# Patient Record
Sex: MALE | Employment: FULL TIME | URBAN - NONMETROPOLITAN AREA
[De-identification: names, ages, dates, MRNs, and addresses within clinical notes are randomized per-mention and may not be internally consistent; named-entity substitution may affect disease eponyms.]

---

## 2024-03-10 ENCOUNTER — HOSPITAL ENCOUNTER (EMERGENCY)
Facility: HOSPITAL | Age: 54
Discharge: HOME | End: 2024-03-10
Attending: EMERGENCY MEDICINE
Payer: OTHER MISCELLANEOUS

## 2024-03-10 ENCOUNTER — APPOINTMENT (EMERGENCY)
Dept: RADIOLOGY | Facility: HOSPITAL | Age: 54
End: 2024-03-10
Payer: OTHER MISCELLANEOUS

## 2024-03-10 VITALS
HEART RATE: 98 BPM | HEIGHT: 68 IN | TEMPERATURE: 98.5 F | OXYGEN SATURATION: 98 % | SYSTOLIC BLOOD PRESSURE: 166 MMHG | WEIGHT: 170 LBS | DIASTOLIC BLOOD PRESSURE: 91 MMHG | BODY MASS INDEX: 25.76 KG/M2 | RESPIRATION RATE: 18 BRPM

## 2024-03-10 DIAGNOSIS — T14.8XXA CONTUSION: Primary | ICD-10-CM

## 2024-03-10 DIAGNOSIS — T14.8XXA BLOOD BLISTER: ICD-10-CM

## 2024-03-10 PROCEDURE — 99284 EMERGENCY DEPT VISIT MOD MDM: CPT | Performed by: EMERGENCY MEDICINE

## 2024-03-10 PROCEDURE — 99283 EMERGENCY DEPT VISIT LOW MDM: CPT

## 2024-03-10 PROCEDURE — 73140 X-RAY EXAM OF FINGER(S): CPT

## 2024-03-10 RX ORDER — IBUPROFEN 600 MG/1
600 TABLET ORAL EVERY 6 HOURS PRN
Qty: 30 TABLET | Refills: 0 | Status: SHIPPED | OUTPATIENT
Start: 2024-03-10 | End: 2024-03-11

## 2024-03-10 NOTE — DISCHARGE INSTRUCTIONS
Return to the ER immediately for any worsening symptoms.  Please follow-up with your PCP/workman's compensation at your job for further evaluation and management.

## 2024-03-10 NOTE — ED PROVIDER NOTES
History  Chief Complaint   Patient presents with    Finger Laceration     Pt arrives reporting left middle finger got pinched at work yesterday and today is more swollen     HPI    None       Past Medical History:   Diagnosis Date    Diabetes mellitus (HCC)     Hypertension     Renal disorder        History reviewed. No pertinent surgical history.    History reviewed. No pertinent family history.  I have reviewed and agree with the history as documented.    E-Cigarette/Vaping    E-Cigarette Use Never User      E-Cigarette/Vaping Substances     Social History     Tobacco Use    Smoking status: Never    Smokeless tobacco: Never   Vaping Use    Vaping status: Never Used   Substance Use Topics    Alcohol use: Never    Drug use: Never       Review of Systems   Constitutional:  Negative for chills and fever.   HENT:  Negative for ear pain and sore throat.    Eyes:  Negative for pain and visual disturbance.   Respiratory:  Negative for cough and shortness of breath.    Cardiovascular:  Negative for chest pain and palpitations.   Gastrointestinal:  Negative for abdominal pain and vomiting.   Genitourinary:  Negative for dysuria and hematuria.   Musculoskeletal:  Negative for arthralgias and back pain.   Skin:  Positive for color change.   Neurological:  Negative for seizures and syncope.   All other systems reviewed and are negative.      Physical Exam  Physical Exam  Vitals and nursing note reviewed.   Constitutional:       General: He is not in acute distress.     Appearance: Normal appearance. He is well-developed and normal weight. He is not ill-appearing.   HENT:      Head: Normocephalic and atraumatic.      Right Ear: External ear normal.      Left Ear: External ear normal.      Nose: Nose normal.      Mouth/Throat:      Mouth: Mucous membranes are moist.   Eyes:      Extraocular Movements: Extraocular movements intact.      Conjunctiva/sclera: Conjunctivae normal.   Pulmonary:      Effort: Pulmonary effort is normal.  No respiratory distress.   Abdominal:      Tenderness: There is no abdominal tenderness.   Musculoskeletal:         General: Swelling, tenderness and signs of injury present.      Cervical back: Normal range of motion and neck supple.      Comments: Blood blister present to the left middle finger with swelling, mild decreased range of motion secondary to pain   Skin:     General: Skin is warm and dry.      Capillary Refill: Capillary refill takes less than 2 seconds.   Neurological:      Mental Status: He is alert.   Psychiatric:         Mood and Affect: Mood normal.         Vital Signs  ED Triage Vitals [03/10/24 0849]   Temperature Pulse Respirations Blood Pressure SpO2   98.5 °F (36.9 °C) 98 18 166/91 98 %      Temp src Heart Rate Source Patient Position - Orthostatic VS BP Location FiO2 (%)   -- -- -- -- --      Pain Score       5           Vitals:    03/10/24 0849   BP: 166/91   Pulse: 98         Visual Acuity      ED Medications  Medications - No data to display    Diagnostic Studies  Results Reviewed       None                   XR finger third digit-middle LEFT    (Results Pending)              Procedures  Procedures         ED Course       Patient had a stable ED course.  X-ray did not show any fracture.  He is stable for discharge                                      Medical Decision Making  Differential diagnosis includes but not limited to: Crush injury, fracture, blister, contusion, sprain    Amount and/or Complexity of Data Reviewed  Radiology: ordered.    Risk  Prescription drug management.             Disposition  Final diagnoses:   Contusion   Blood blister     Time reflects when diagnosis was documented in both MDM as applicable and the Disposition within this note       Time User Action Codes Description Comment    3/10/2024  9:37 AM Eulalia Delacruz Add [T14.8XXA] Contusion     3/10/2024  9:37 AM Eulalia Delacruz Add [T14.8XXA] Blood blister           ED Disposition       ED Disposition   Discharge     Condition   Stable    Date/Time   Sun Mar 10, 2024  9:37 AM    Comment   Gerardo Hagen discharge to home/self care.                   Follow-up Information       Follow up With Specialties Details Why Contact Info    Jan Gonzalez    [address] [place] [id number] [phone]            Discharge Medication List as of 3/10/2024  9:39 AM        START taking these medications    Details   ibuprofen (MOTRIN) 600 mg tablet Take 1 tablet (600 mg total) by mouth every 6 (six) hours as needed for moderate pain, Starting Sun 3/10/2024, Normal             No discharge procedures on file.    PDMP Review       None            ED Provider  Electronically Signed by             Eulalia Delacruz DO  03/10/24 5916